# Patient Record
Sex: FEMALE | Race: WHITE | Employment: FULL TIME | URBAN - METROPOLITAN AREA
[De-identification: names, ages, dates, MRNs, and addresses within clinical notes are randomized per-mention and may not be internally consistent; named-entity substitution may affect disease eponyms.]

---

## 2017-01-09 ENCOUNTER — HOSPITAL ENCOUNTER (EMERGENCY)
Facility: HOSPITAL | Age: 22
Discharge: NURSING, REHAB OR LONG-TERM CARE | End: 2017-01-09
Attending: EMERGENCY MEDICINE
Payer: COMMERCIAL

## 2017-01-09 VITALS
WEIGHT: 115 LBS | OXYGEN SATURATION: 100 % | HEART RATE: 97 BPM | TEMPERATURE: 98 F | HEIGHT: 62 IN | SYSTOLIC BLOOD PRESSURE: 142 MMHG | RESPIRATION RATE: 20 BRPM | DIASTOLIC BLOOD PRESSURE: 75 MMHG | BODY MASS INDEX: 21.16 KG/M2

## 2017-01-09 DIAGNOSIS — F19.10 SUBSTANCE ABUSE (HCC): ICD-10-CM

## 2017-01-09 DIAGNOSIS — F41.9 ANXIETY: ICD-10-CM

## 2017-01-09 DIAGNOSIS — F32.A DEPRESSIVE DISORDER: Primary | ICD-10-CM

## 2017-01-09 LAB
ANION GAP SERPL CALC-SCNC: 6 MMOL/L (ref 0–18)
B-HCG UR QL: NEGATIVE
BASOPHILS # BLD: 0.1 K/UL (ref 0–0.2)
BASOPHILS NFR BLD: 1 %
BENZODIAZ UR QL SCN: DETECTED
BILIRUB UR QL: NEGATIVE
BUN SERPL-MCNC: 6 MG/DL (ref 8–20)
BUN/CREAT SERPL: 7.7 (ref 10–20)
CALCIUM SERPL-MCNC: 9.3 MG/DL (ref 8.5–10.5)
CANNABINOIDS UR QL SCN: DETECTED
CANNABINOIDS UR QL SCN: DETECTED
CHLORIDE SERPL-SCNC: 105 MMOL/L (ref 95–110)
CO2 SERPL-SCNC: 29 MMOL/L (ref 22–32)
COLOR UR: YELLOW
CREAT SERPL-MCNC: 0.78 MG/DL (ref 0.5–1.5)
EOSINOPHIL # BLD: 0.2 K/UL (ref 0–0.7)
EOSINOPHIL NFR BLD: 3 %
ERYTHROCYTE [DISTWIDTH] IN BLOOD BY AUTOMATED COUNT: 13.5 % (ref 11–15)
ETHANOL SERPL-MCNC: 1 MG/DL
GLUCOSE SERPL-MCNC: 93 MG/DL (ref 70–99)
GLUCOSE UR-MCNC: NEGATIVE MG/DL
HCT VFR BLD AUTO: 39.6 % (ref 35–48)
HGB BLD-MCNC: 13.3 G/DL (ref 12–16)
HGB UR QL STRIP.AUTO: NEGATIVE
KETONES UR-MCNC: NEGATIVE MG/DL
LYMPHOCYTES # BLD: 1.2 K/UL (ref 1–4)
LYMPHOCYTES NFR BLD: 22 %
MCH RBC QN AUTO: 30.1 PG (ref 27–32)
MCHC RBC AUTO-ENTMCNC: 33.7 G/DL (ref 32–37)
MCV RBC AUTO: 89.4 FL (ref 80–100)
MONOCYTES # BLD: 0.4 K/UL (ref 0–1)
MONOCYTES NFR BLD: 8 %
NEUTROPHILS # BLD AUTO: 3.8 K/UL (ref 1.8–7.7)
NEUTROPHILS NFR BLD: 66 %
NITRITE UR QL STRIP.AUTO: NEGATIVE
OSMOLALITY UR CALC.SUM OF ELEC: 287 MOSM/KG (ref 275–295)
PH UR: 7 [PH] (ref 5–8)
PLATELET # BLD AUTO: 301 K/UL (ref 140–400)
PMV BLD AUTO: 7.7 FL (ref 7.4–10.3)
POTASSIUM SERPL-SCNC: 3.3 MMOL/L (ref 3.3–5.1)
PROT UR-MCNC: NEGATIVE MG/DL
RBC # BLD AUTO: 4.43 M/UL (ref 3.7–5.4)
RBC #/AREA URNS AUTO: 1 /HPF
SODIUM SERPL-SCNC: 140 MMOL/L (ref 136–144)
SP GR UR STRIP: 1.02 (ref 1–1.03)
UROBILINOGEN UR STRIP-ACNC: <2
VIT C UR-MCNC: NEGATIVE MG/DL
WBC # BLD AUTO: 5.6 K/UL (ref 4–11)
WBC #/AREA URNS AUTO: 7 /HPF

## 2017-01-09 PROCEDURE — 81025 URINE PREGNANCY TEST: CPT

## 2017-01-09 PROCEDURE — 80320 DRUG SCREEN QUANTALCOHOLS: CPT | Performed by: EMERGENCY MEDICINE

## 2017-01-09 PROCEDURE — 36415 COLL VENOUS BLD VENIPUNCTURE: CPT

## 2017-01-09 PROCEDURE — 99285 EMERGENCY DEPT VISIT HI MDM: CPT

## 2017-01-09 PROCEDURE — 81001 URINALYSIS AUTO W/SCOPE: CPT | Performed by: EMERGENCY MEDICINE

## 2017-01-09 PROCEDURE — 80307 DRUG TEST PRSMV CHEM ANLYZR: CPT | Performed by: EMERGENCY MEDICINE

## 2017-01-09 PROCEDURE — 87086 URINE CULTURE/COLONY COUNT: CPT | Performed by: EMERGENCY MEDICINE

## 2017-01-09 PROCEDURE — 85025 COMPLETE CBC W/AUTO DIFF WBC: CPT | Performed by: EMERGENCY MEDICINE

## 2017-01-09 PROCEDURE — 80048 BASIC METABOLIC PNL TOTAL CA: CPT | Performed by: EMERGENCY MEDICINE

## 2017-01-09 RX ORDER — POTASSIUM CHLORIDE 20 MEQ/1
40 TABLET, EXTENDED RELEASE ORAL ONCE
Status: COMPLETED | OUTPATIENT
Start: 2017-01-09 | End: 2017-01-09

## 2017-01-09 RX ORDER — TRAZODONE HYDROCHLORIDE 50 MG/1
50 TABLET ORAL NIGHTLY PRN
Status: DISCONTINUED | OUTPATIENT
Start: 2017-01-09 | End: 2017-01-09

## 2017-01-09 NOTE — BH PROGRESS NOTE
Unable to verify coverage through [facility]. Pt's mom is calling to figure out insurance coverage. Awaiting call back.

## 2017-01-09 NOTE — BH PROGRESS NOTE
Funmi Vega from [facility] [phone] called and reports pt's mother, Judith Rizzo, completed her premium payment and the patient, Sheila Driver, has current active insurance coverage as of today 1/9/17.     Funmi Vega reports it may take a few days for online

## 2017-01-09 NOTE — ED INITIAL ASSESSMENT (HPI)
Via medics from home---patient got in verbal altercation with mother which she does often and made suicidal comments. Arben denies SI or HI at this time, she states her mother \"twists her words. \"    Mother wants to petition patient

## 2017-01-09 NOTE — ED NOTES
Seclusion in place. Belongings checked by security. Patient denies SI, HI, denies making comments to her mother. Reports having hx of anxiety and depression, but \"feels great. \" right now and has a good plan ahead of her.  Labs and urine sent, Dr. Anand Valente at
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
## 2017-01-09 NOTE — ED PROVIDER NOTES
Patient Seen in: [facility] AND [facility] Emergency Department    History   Patient presents with:  Eval-P (psychiatric)    Stated Complaint: psych eval    HPI    The patient is a 44-year-old female brought by EMS for psychiatric evaluation.   The patient has a Other Disorders Associated Paternal Grandmother    • ADHD Maternal Aunt    • Alcohol and Other Disorders Associated Maternal Aunt    • Substance Abuse Maternal Aunt    • Anxiety Maternal Aunt    • Alcohol and Other Disorders Associated Maternal Grandfather cutting   Neurological: She is alert and oriented to person, place, and time. She has normal reflexes. Skin: Skin is warm and dry. No rash noted. Psychiatric: Her speech is normal. Her mood appears anxious. She expresses impulsivity.  She exhibits a dep Impression:  Depressive disorder  (primary encounter diagnosis)  Anxiety  Substance abuse    Disposition:  Psychiatric transfer    Follow-up:  No follow-up provider specified.     Medications Prescribed:  Current Discharge Medication List

## 2017-01-09 NOTE — ED NOTES
Patient ate 100% of lunch tray. Mother to sign petition and plan to transfer. Seclusion remains in place.

## 2017-01-09 NOTE — BH PROGRESS NOTE
Julissa from [facility] called back. She stated the charge nurse is in staffing now and will review case. They have some pending discharges and think they will be able to accept pt tonight. They will call back in about half an hr.

## 2017-01-10 PROBLEM — Z86.14 HX MRSA INFECTION: Status: ACTIVE | Noted: 2017-01-10

## 2017-01-10 NOTE — BH PROGRESS NOTE
Completed pre-cert for IP LOC. Carlos Kirk 83 [phone] and spoke with Merary Auguste. Merary Auguste reports 4 days IP authorized 1/9/17-1/12/17 with review due 1/12/17. Merary Auguste reports a BC rep will contact [facility] UR to complete review on 1/12/17.     Authorization # [id number]

## 2017-01-10 NOTE — BH PROGRESS NOTE
Pt is accepted for transfer to [facility].     Accepting MD is Dr. Matthew Zavala    RN to RN report can be called into [phone]

## 2017-01-10 NOTE — BH PROGRESS NOTE
LM on KAYLA transfer line re: pre-cert completed, requesting RN to RN information. Awaiting call back.

## 2017-01-10 NOTE — ED NOTES
Attempt report x 1 at [facility] will call back.

## 2017-02-10 ENCOUNTER — APPOINTMENT (OUTPATIENT)
Dept: LAB | Facility: HOSPITAL | Age: 22
End: 2017-02-10
Attending: INTERNAL MEDICINE
Payer: COMMERCIAL

## 2017-02-10 ENCOUNTER — OFFICE VISIT (OUTPATIENT)
Dept: INTERNAL MEDICINE CLINIC | Facility: CLINIC | Age: 22
End: 2017-02-10

## 2017-02-10 VITALS
WEIGHT: 117 LBS | DIASTOLIC BLOOD PRESSURE: 76 MMHG | HEIGHT: 61.5 IN | BODY MASS INDEX: 21.81 KG/M2 | SYSTOLIC BLOOD PRESSURE: 125 MMHG | TEMPERATURE: 99 F | HEART RATE: 67 BPM

## 2017-02-10 DIAGNOSIS — F17.200 SMOKER: ICD-10-CM

## 2017-02-10 DIAGNOSIS — F32.A DEPRESSIVE DISORDER: ICD-10-CM

## 2017-02-10 DIAGNOSIS — Z00.00 PHYSICAL EXAM: ICD-10-CM

## 2017-02-10 DIAGNOSIS — I10 ESSENTIAL HYPERTENSION: Primary | ICD-10-CM

## 2017-02-10 LAB
ALBUMIN SERPL BCP-MCNC: 3.9 G/DL (ref 3.5–4.8)
ALBUMIN/GLOB SERPL: 1.4 {RATIO} (ref 1–2)
ALP SERPL-CCNC: 45 U/L (ref 39–325)
ALT SERPL-CCNC: 14 U/L (ref 14–54)
ANION GAP SERPL CALC-SCNC: 7 MMOL/L (ref 0–18)
AST SERPL-CCNC: 17 U/L (ref 15–41)
BASOPHILS # BLD: 0.1 K/UL (ref 0–0.2)
BASOPHILS NFR BLD: 1 %
BILIRUB SERPL-MCNC: 0.3 MG/DL (ref 0.3–1.2)
BUN SERPL-MCNC: 8 MG/DL (ref 8–20)
BUN/CREAT SERPL: 10.1 (ref 10–20)
CALCIUM SERPL-MCNC: 9.2 MG/DL (ref 8.5–10.5)
CHLORIDE SERPL-SCNC: 108 MMOL/L (ref 95–110)
CHOLEST SERPL-MCNC: 95 MG/DL (ref 110–200)
CO2 SERPL-SCNC: 24 MMOL/L (ref 22–32)
CREAT SERPL-MCNC: 0.79 MG/DL (ref 0.5–1.5)
EOSINOPHIL # BLD: 0.2 K/UL (ref 0–0.7)
EOSINOPHIL NFR BLD: 2 %
ERYTHROCYTE [DISTWIDTH] IN BLOOD BY AUTOMATED COUNT: 14.7 % (ref 11–15)
GLOBULIN PLAS-MCNC: 2.8 G/DL (ref 2.5–3.7)
GLUCOSE SERPL-MCNC: 77 MG/DL (ref 70–99)
HCT VFR BLD AUTO: 36.7 % (ref 35–48)
HDLC SERPL-MCNC: 39 MG/DL
HGB BLD-MCNC: 12.2 G/DL (ref 12–16)
LDLC SERPL CALC-MCNC: 45 MG/DL (ref 0–99)
LYMPHOCYTES # BLD: 1.2 K/UL (ref 1–4)
LYMPHOCYTES NFR BLD: 13 %
MCH RBC QN AUTO: 30 PG (ref 27–32)
MCHC RBC AUTO-ENTMCNC: 33.3 G/DL (ref 32–37)
MCV RBC AUTO: 90.2 FL (ref 80–100)
MONOCYTES # BLD: 0.6 K/UL (ref 0–1)
MONOCYTES NFR BLD: 6 %
NEUTROPHILS # BLD AUTO: 7.7 K/UL (ref 1.8–7.7)
NEUTROPHILS NFR BLD: 79 %
NONHDLC SERPL-MCNC: 56 MG/DL
OSMOLALITY UR CALC.SUM OF ELEC: 285 MOSM/KG (ref 275–295)
PLATELET # BLD AUTO: 250 K/UL (ref 140–400)
PMV BLD AUTO: 8.5 FL (ref 7.4–10.3)
POTASSIUM SERPL-SCNC: 3.7 MMOL/L (ref 3.3–5.1)
PROT SERPL-MCNC: 6.7 G/DL (ref 5.9–8.4)
RBC # BLD AUTO: 4.07 M/UL (ref 3.7–5.4)
SODIUM SERPL-SCNC: 139 MMOL/L (ref 136–144)
TRIGL SERPL-MCNC: 54 MG/DL (ref 1–149)
TSH SERPL-ACNC: 1.09 UIU/ML (ref 0.34–5.6)
VIT B12 SERPL-MCNC: 371 PG/ML (ref 181–914)
WBC # BLD AUTO: 9.7 K/UL (ref 4–11)

## 2017-02-10 PROCEDURE — 36415 COLL VENOUS BLD VENIPUNCTURE: CPT | Performed by: INTERNAL MEDICINE

## 2017-02-10 PROCEDURE — 86580 TB INTRADERMAL TEST: CPT | Performed by: INTERNAL MEDICINE

## 2017-02-10 PROCEDURE — 80307 DRUG TEST PRSMV CHEM ANLYZR: CPT

## 2017-02-10 PROCEDURE — 99395 PREV VISIT EST AGE 18-39: CPT | Performed by: INTERNAL MEDICINE

## 2017-02-10 RX ORDER — QUETIAPINE 25 MG/1
25 TABLET, FILM COATED ORAL NIGHTLY
COMMUNITY
End: 2020-01-21

## 2017-02-10 NOTE — PROGRESS NOTES
HPI:   Nicholas Ojeda is a 22year old female who presents for a complete physical exam and needs drug screen and ppd test for work . Symptoms: periods are regular. Patient denies any complais. She grover have htn since age of 7, initially due to joint pain Comment: occassionally     Exercise: 4 times per week. Diet: watches fats closely     REVIEW OF SYSTEMS:     Constitutional Negative Chills, fatigue and fever.    ENMT Negative Hearing loss, nasal drainage, pain in/around ear, sinus pressure and sor Cardiovascular Normal Regular rate and rhythm. No murmurs, gallops, or rubs   Vascular Normal Pulses - Dorsalis pedis: Normal. Bruits - Carotids: Absent. Extremity Normal No edema. No varicosities. Abdomen Normal Inspection normal, BS active.  No abd

## 2017-02-10 NOTE — PATIENT INSTRUCTIONS
Life After Combat: Coping with Depression    Returning home from combat can be both joyful and challenging. It’s common to have mixed emotions during this time. But are sadness, guilt, or despair taking over your life?  You might feel alone, as if no one Being in combat has put you under a lot of stress. You may be grieving for friends who , or upset about changes that happened at home while you were away. You may feel guilty or sad about the war.  And you may be coping with physical injuries that have · Antidepressant medications help relieve symptoms. It may take a few weeks for an antidepressant to start to improve your mood. If it doesn’t seem to be working, it may need more time.  Or you may need to try more than one medication or dosage before findi · Get relief from stress. Ask your healthcare provider about relaxation exercises and techniques to help relieve stress. · Eat right. A balanced and healthy diet helps keep your body healthy. · Put off major decisions. Depression can cloud your judgment. Date Last Reviewed: 2/4/2015  © 0132-6104 The [address] [place] [address], [address] [place], [place] [address]. All rights reserved. This information is not intended as a substitute for professional medical care.  Always follow your healthcare professional' · Get relief from stress. Ask your healthcare provider for relaxation exercises and techniques to help relieve stress. · Eat right. A balanced and healthy diet helps keep your body healthy.   Date Last Reviewed: 1/19/2015  © 7047-1990 The [employer],

## 2017-02-13 ENCOUNTER — NURSE ONLY (OUTPATIENT)
Dept: INTERNAL MEDICINE CLINIC | Facility: CLINIC | Age: 22
End: 2017-02-13

## 2017-02-13 LAB — 25(OH)D3 SERPL-MCNC: 44.6 NG/ML

## 2017-10-14 NOTE — ED INITIAL ASSESSMENT (HPI)
FAMILY FOUND HER UNRESPONSIVE AGONAL BREATHING, MEDICS STATED THE PATIENT WAS 60 % ON RA, ALERT AND VERBAL NOW.  NO PRESENT COMPLAINTS, + SNORTED HEROIN

## 2017-10-14 NOTE — ED PROVIDER NOTES
Patient Seen in: [facility] Emergency Department    History   Patient presents with:  Poisoning/Overdose (metabolic, psychiatric)    Stated Complaint: Theoplis [place]    HPI    52-year-old female with history of hypertension, anxiety, and depressi %  O2 Device: None (Room air)    Current:/70   Pulse 102   Temp 98.8 °F (37.1 °C) (Temporal)   Resp 18   SpO2 100%         Physical Exam    General Appearance: alert, no distress  Eyes: pupils equal and round no pallor or injection  ENT, Mouth: mucou

## 2017-10-14 NOTE — ED NOTES
PATIENT PLANS ON MOVING TO [place] TOMORROW TO BE WITH HER DAD, RECENT DISCHARGE FROM REHAB, TODAY SNORTED HEROIN AND TOOK A Rhoda Bonnie.  PATIENT ALERT AN COOPERATIVE

## 2017-10-15 NOTE — BH PROGRESS NOTE
Spoke to Alexx son at [facility] and she stated that due to isolation concerns, they do not have a bed at this time.

## 2017-10-15 NOTE — BH PROGRESS NOTE
Spoke to Lilian Bazan at [facility] and she requested packet be faxed and will call back re: availability

## 2017-10-15 NOTE — ED INITIAL ASSESSMENT (HPI)
SPOKE WITH THE MOTHER, GRANDMOTHER AND POLICE, TEARFUL FAMILY MEMBERS STATING THAT FOR \"A WHILE NOW\" PATIENT HAS BEEN TALKING TO HER FAMILY STATING HOW DEPRESSED SHE HAS BEEN, FEELING HOPELESS And suicidal. Mom states that last night she was getting read

## 2017-10-15 NOTE — ED NOTES
SPOKE WITH THE MOM, DR Macario Peñaloza TALKED TO THE MOM AND THEN THE PATIENT, SECURITY CALLED FOR A FLIGHT RISK AND POSSIBLY RISK TO SELF, PATIENT DENIED SI ALTHOUGH MOM AND GRANDMOTHER DID PETITION AND DR Macario Peñaloza DID CERTIFICATE
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
## 2017-10-15 NOTE — ED NOTES
Pt given d/c instructions and stated understanding.  Pt to ambulate out of ed at this time with mother
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
## 2017-10-15 NOTE — BH PROGRESS NOTE
Spoke to Chase Ferreira at [facility] and pt was accepted by Dr. Radha Cerrato information: Spoke to Clinton Palacios at [facility] 45  Approved 4 days ( 10/15-10/18) with review on 18th and a  will call UR   Auth: [id number]    They will call nurse for r

## 2017-10-15 NOTE — BH PROGRESS NOTE
Called [facility] back and they stated that they would need to call us back after reviewing packet    [facility] stated they do not have any available beds.      I left a VM for [facility] re: bed availability and waiting to hear back

## 2017-10-15 NOTE — ED NOTES
PATIENT STATED THAT HER MOM AND GRANDMOTHER WHERE TRYING TO SAY THAT SHE WAS SUICIDAL AND THAT SHE ATTEMPTED TO HURT HERSELF YESTERDAY. PATIENT DENIES SI. YEN MATTHEW, KHANG PSYCH LIASON WITH PATIENT AND ALEKSEY

## 2017-10-15 NOTE — ED PROVIDER NOTES
Patient Seen in: [facility] AND [facility] Emergency Department    History   Patient presents with:  Checkup    Stated Complaint: EVALUATION    HPI    21-year-old female presents via EMS for psychiatric evaluation.   Patient was seen in the hospital yesterday aft reviewed and are negative. Positive for stated complaint: EVALUATION  Other systems are as noted in HPI. Constitutional and vital signs reviewed. All other systems reviewed and negative except as noted above.     PSFH elements reviewed from today making as well as her overdose on heroin last night and history of suicide attempts in the past, I believe the patient is high risk for self-harm. She requires psychiatric evaluation. She is medically cleared for psychiatric admission.                Disp

## 2017-10-16 NOTE — ED NOTES
Called Titi Diaz with [facility], made her aware pt is en route.

## 2018-08-30 ENCOUNTER — APPOINTMENT (OUTPATIENT)
Dept: OTHER | Facility: HOSPITAL | Age: 23
End: 2018-08-30
Attending: FAMILY MEDICINE

## 2018-08-30 ENCOUNTER — OFFICE VISIT (OUTPATIENT)
Dept: INTERNAL MEDICINE CLINIC | Facility: HOSPITAL | Age: 23
End: 2018-08-30
Attending: EMERGENCY MEDICINE

## 2018-08-30 DIAGNOSIS — Z00.00 WELLNESS EXAMINATION: Primary | ICD-10-CM

## 2018-08-30 PROCEDURE — 86480 TB TEST CELL IMMUN MEASURE: CPT

## 2018-08-31 LAB
M TB IFN-G CD4+ BCKGRND COR BLD-ACNC: -0.01 IU/ML
M TB IFN-G CD4+ T-CELLS BLD-ACNC: 0.05 IU/ML
M TB TUBERC IFN-G BLD QL: NEGATIVE
M TB TUBERC IGNF/MITOGEN IGNF CONTROL: 7.54 IU/ML

## 2019-07-31 ENCOUNTER — APPOINTMENT (OUTPATIENT)
Dept: OTHER | Facility: HOSPITAL | Age: 24
End: 2019-07-31
Attending: EMERGENCY MEDICINE

## 2019-08-20 NOTE — ED NOTES
Patient tearful requesting multiple apple juice, after 4th juice patient was witnessed by  running out of the room in room 25 through the side door out the 26 of the

## 2019-08-20 NOTE — ED PROVIDER NOTES
Pt endorsed to me by Dr. Ramakrishna Knutson for continuation of care. Pt was evaluated by crisis worker. Pt is not a danger to herself or others. Denies any SI, HI, hallucinations. Pt to be discharged home.

## 2019-08-20 NOTE — ED PROVIDER NOTES
Patient Seen in: [facility] AND [facility] Emergency Department    History   Patient presents with:  Overdose    Stated Complaint: OVERDOSE    HPI    81-year-old female with past medical history significant for polysubstance abuse presents to the emergency depar Nontender. Nondistended. Soft. Bowel sounds are normal.   Back:   : Musculoskeletal: Normal range of motion. No deformity. Lymphadenopathy: No sig cervical LAD   Neurological: Awake, alert. Normal reflexes. No cranial nerve deficit.     Skin: Skin is ---------                               -----------         ------                     CBC W/ DIFFERENTIAL[[id number]]          Abnormal            Final result                 Please view results for these tests on the individual orders.    URINE CULTURE,

## 2019-08-20 NOTE — ED NOTES
Patient in room 25 asking for multiple juices,  witnessed patient running out the side door in hospital gon, managed to get past doorways and out into the parking lot, patient found by security on a street, brought in by [employer] and

## 2019-08-21 NOTE — BH LEVEL OF CARE ASSESSMENT
Level of Care Assessment Note    General Questions  Why are you here?: Patient initially unable to be assessed due to drug overdose.    Precipitating Events: Patient was brought to the ED after [facility] PD found her unresponsive, patient given Narcan and is Denies    Danger to Others/Property  Have you harmed someone or had thoughts about wanting someone harmed or killed in the past 30 days?: No  Have you harmed someone or had thoughts about wanting someone harmed or killed further back than 30 days?: No  Cur you are too thin?: No  Would you say that Food dominates your life?: No  SCOFF Score: 0                                                                                                        Current/Previous MH/CD Providers  Hospitalizations, Placements, T it has ever been? : No         Sedative Use  Age at first use?: 19  Route: Oral  Average current amount used? : \"I don't know. \"  How long with this pattern of use?: off and on  Last Use?: last night  Longest period of sobriety/abstinence?: 30 days  Is yo Speech: Appropriate  Intensity of Volume: Ordinary  Clarity: Clear  Cognition  Concentration: Unimpaired  Memory: Recent memory intact; Remote memory intact  Orientation Level: Oriented X4  Insight: Poor  Fair/poor insight as evidenced by: evidenced by symp Disorder             Sign-In  Patient Verbalized Understanding:  Yes

## 2019-08-21 NOTE — ED NOTES
Patient discharged home in no acute distress in care of mom and richariyehuda. A&Ox4, skin p/w/d, denies cp/sob. Ambulating with steady gait and verbalized understanding of d/c instructions and prescriptions.

## 2019-10-18 NOTE — ED INITIAL ASSESSMENT (HPI)
Per EMS called for heroin overdose. Narcan 6 mg given per IN. Pt now aox4; was initially unresponsive.

## 2019-10-18 NOTE — ED PROVIDER NOTES
Patient Seen in: [facility] AND [facility] Emergency Department      History   Patient presents with:  Eval-P (psychiatric)    Stated Complaint: OD    HPI    21 yo female brought by EMS after heroin overdose.  She was found unresponsive with decreased respiration Conjunctiva/sclera: Conjunctivae normal.      Pupils: Pupils are equal, round, and reactive to light. Neck:      Musculoskeletal: Normal range of motion and neck supple. Cardiovascular:      Rate and Rhythm: Normal rate and regular rhythm.    Pulmonary:

## 2019-11-07 NOTE — ED PROVIDER NOTES
Patient Seen in: [facility] AND [facility] Emergency Department      History   Patient presents with: Other    Stated Complaint: DUI/possible pregnancy    HPI    Patient is a 45-year-old female with a history of polysubstance abuse.   She states she took some Va sleeping but easily aroused she answers questions appropriately she is slightly slurred speech. HEENT:   Head: Normocephalic and atraumatic.    Right Ear: External ear normal.   Left Ear: External ear normal.   Nose: Nose normal.   Mouth/Throat: Oropharynx Impression:  Substance abuse ([facility] [address]  (primary encounter diagnosis)    Disposition:  Discharge  11/7/2019  8:43 am    Follow-up:  Meagan Hays, [address] [place] [address]  [phone]    Schedule an appointment as soon as p

## 2019-11-07 NOTE — ED NOTES
Pt to ED A/O x 4- pt was arrested this AM at 0200 by [facility] for DUI- while pt was in custody EMS state that pt said she had a pregnancy scare this past week and took Heroin and Xanax for her anxiety.  PD sent pt here for evaluation after she was releas

## 2019-11-07 NOTE — ED NOTES
Security checked BF prior to entering-  Ok to send pt home per ER MD. Pt with steady gait. Spoke with pt mother.

## 2019-11-09 NOTE — ED PROVIDER NOTES
Patient Seen in: [facility] AND [facility] Emergency Department      History   Patient presents with:  Eval-P (psychiatric)    Stated Complaint: alleged overdose    HPI    26-year-old female with history of substance abuse who reportedly is going for intake jonathan atraumatic. Eyes: Conjunctivae are normal. Pupils are equal, round, and reactive to light. Neck: Normal range of motion. Neck supple. Cardiovascular: Normal rate, regular rhythm and intact distal pulses.     Pulmonary/Chest: Effort normal. No respirat List

## 2019-11-09 NOTE — ED NOTES
Call from triage desk. Mother, Rodger Aragon is leaving to  clothes for Rodger Aragon and return. Mother informed triage that Skye Pierson has a bed at [facility].   Mother cane be reached at [phone]

## 2019-11-09 NOTE — ED NOTES
Pt denies SI/HI at time of discharge. Pt mother to take patient home. Ok per patient for mother Mcintosh Memos to receive results of urine tests when the are resulted.

## 2019-11-09 NOTE — ED INITIAL ASSESSMENT (HPI)
Mom was unable to get in touch with her daughter who is a known heroin user, called 911 and multiple boxes of narcan in her house, arrives to ED with c/o no sleep in 4 days, + extremely dry mm, slurred speech and nodding off and unable to pay attention, kenzie

## 2019-11-13 NOTE — ED INITIAL ASSESSMENT (HPI)
Pt reports snorting 2 lines of heroine, she states it may have been laced with fentanyl. Boyfriend gave 4mg narcan IN.  Pt a/o x4 speaking in full sentences

## 2019-11-13 NOTE — ED PROVIDER NOTES
Patient Seen in: [facility] AND [facility] Emergency Department      History   Patient presents with:  Karen-D (detox)    Stated Complaint: OD    HPI    60-year-old female presents for an accidental overdose.   Patient states that she was using heroin and thinks t ED Triage Vitals [11/13/19 1724]   /66   Pulse 88   Resp 18   Temp 99.3 °F (37.4 °C)   Temp src Temporal   SpO2 99 %   O2 Device None (Room air)       Current:BP 99/52   Pulse 98   Temp 99.3 °F (37.4 °C) (Temporal)   Resp 18   Ht 157.5 cm (5' 2\" Patient doing well, alert and oriented x4, O2 sat is 99% RA.  ------------------------------------------------------------  Time: 11/13 1923  Comment: Patient still A/O x4, satting at 98% RA.               MDM    Patient was observed for 2 hours.   She was 0

## 2019-12-19 NOTE — LETTER
Date & Time: 5/17/2020, 5:09 PM  Patient: [facility]  Encounter Provider(s):    Favio Abrams MD       To Whom It May Concern:    Gunjan Vega was seen and treated in our department on 5/17/2020. She can return to work 5-.     If you
No

## 2020-02-13 ENCOUNTER — HOSPITAL ENCOUNTER (OUTPATIENT)
Age: 25
Discharge: ANOTHER HOSPITAL | End: 2020-02-13
Payer: COMMERCIAL

## 2020-02-13 DIAGNOSIS — J45.901 SEVERE ASTHMA WITH ACUTE EXACERBATION, UNSPECIFIED WHETHER PERSISTENT: Primary | ICD-10-CM

## 2020-02-13 DIAGNOSIS — R41.82 ALTERED MENTAL STATUS, UNSPECIFIED ALTERED MENTAL STATUS TYPE: ICD-10-CM

## 2020-02-13 DIAGNOSIS — R06.03 RESPIRATORY DISTRESS: ICD-10-CM

## 2020-02-13 DIAGNOSIS — R55 SYNCOPE, NEAR: ICD-10-CM

## 2020-02-13 PROCEDURE — 93010 ELECTROCARDIOGRAM REPORT: CPT

## 2020-02-13 PROCEDURE — 99214 OFFICE O/P EST MOD 30 MIN: CPT

## 2020-02-13 PROCEDURE — 94640 AIRWAY INHALATION TREATMENT: CPT

## 2020-02-13 PROCEDURE — 93005 ELECTROCARDIOGRAM TRACING: CPT

## 2020-02-13 PROCEDURE — 93010 ELECTROCARDIOGRAM REPORT: CPT | Performed by: EMERGENCY MEDICINE

## 2020-02-13 RX ORDER — IPRATROPIUM BROMIDE AND ALBUTEROL SULFATE 2.5; .5 MG/3ML; MG/3ML
3 SOLUTION RESPIRATORY (INHALATION) ONCE
Status: COMPLETED | OUTPATIENT
Start: 2020-02-13 | End: 2020-02-13

## 2020-02-13 NOTE — ED PROVIDER NOTES
Patient Seen in: [address]      History   Patient presents with:  Dyspnea GOMEZ SOB    Stated Complaint: difficulty breathing    HPI    41-year-old female walked in from her outpatient rehab program upstairs dusky and yuan wheezing. Cardiovascular: Positive for chest pain. Neurological: Positive for dizziness, weakness and light-headedness. Positive for stated complaint: difficulty breathing  Other systems are as noted in HPI.   Constitutional and vital signs revie

## 2020-02-13 NOTE — ED INITIAL ASSESSMENT (HPI)
Pt presents to the IC with c/o chest pain since this morning, slowly getting worse. Pt also notes SOB. Pt quit smoking but started vaping again recently. Pt arrives unregistered, dusky in color with a pulse ox at 54%.  Pt gave herself neb tx x2 this morning

## 2020-04-15 ENCOUNTER — HOSPITAL ENCOUNTER (EMERGENCY)
Facility: HOSPITAL | Age: 25
Discharge: HOME | End: 2020-04-15
Attending: EMERGENCY MEDICINE
Payer: COMMERCIAL

## 2020-04-15 VITALS
BODY MASS INDEX: 23.22 KG/M2 | WEIGHT: 123 LBS | TEMPERATURE: 98 F | SYSTOLIC BLOOD PRESSURE: 164 MMHG | HEIGHT: 61 IN | RESPIRATION RATE: 18 BRPM | DIASTOLIC BLOOD PRESSURE: 119 MMHG | OXYGEN SATURATION: 94 % | HEART RATE: 100 BPM
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
## 2020-04-15 DIAGNOSIS — S40.812A ABRASION OF LEFT UPPER EXTREMITY, INITIAL ENCOUNTER: ICD-10-CM

## 2020-04-15 DIAGNOSIS — S00.83XA CONTUSION OF FACE, INITIAL ENCOUNTER: ICD-10-CM

## 2020-04-15 PROCEDURE — 99283 EMERGENCY DEPT VISIT LOW MDM: CPT

## 2020-04-15 RX ORDER — ACETAMINOPHEN 500 MG
1000 TABLET ORAL ONCE
Status: COMPLETED | OUTPATIENT
Start: 2020-04-15 | End: 2020-04-15
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
## 2020-05-17 ENCOUNTER — APPOINTMENT (OUTPATIENT)
Dept: GENERAL RADIOLOGY | Facility: HOSPITAL | Age: 25
End: 2020-05-17
Attending: EMERGENCY MEDICINE
Payer: COMMERCIAL

## 2020-05-17 PROBLEM — F11.20 OPIATE ADDICTION (HCC): Status: ACTIVE | Noted: 2020-05-17

## 2020-05-17 PROCEDURE — 71045 X-RAY EXAM CHEST 1 VIEW: CPT | Performed by: EMERGENCY MEDICINE

## 2020-05-17 NOTE — BH LEVEL OF CARE ASSESSMENT
Level of Care Assessment Note    General Questions  Why are you here?: \"This withdrawal is really bad. I was having nausea, chills sweeating, my stomach hurt really bad. My mother called Krish Santiago and they wanted me checked out.   The past 2 weeks BARBARA young thoughts of killing yourself? (past 30 days): No  6.  Have you ever done anything, started to do anything, or prepared to do anything to end your life? (lifetime): No  Score - BH OV: No Risk  Describe : suicidal thoughts/urges are denied  Is your experience Calculations  Weight: 115 lb  BMI (Calculated): 21.7  IBW LBS [place]: 105 LBS  IBW %: 109.52 %  IBW + 10%: 115.5 LBS  IBW - 10%: 94.5 LBS  SCOFF Questionnaire  Do you make yourself Sick because you feel uncomfortably full?: No  Do you worry that you have lo 5/15/20  Longest period of sobriety/abstinence?: \"a few months\"  Is your current use the most/worst it has ever been? : Yes                        Support for Recovery  Is your living environment a supportive place for recovery?: No  Describe: boyfriend Slouched  Rate of Movement: Normal  Mood and Affect  Mood or Feelings: Sadness  Appropriateness of Affect: Congruent to mood  Range of Affect: Normal  Stability of Affect: Stable  Attitude toward staff: Co-operative  Speech  Rate of Speech: Appropriate  Fl histroy of mental health treatment/ medations is denied other than the Suboxone/     Risk/Protective Factors  Risk Factors: Substance use or abuse  Protective Factors: mother and co-workers    Motivational Stage of Change  Motivational Stage of Change: Con

## 2020-05-17 NOTE — ED INITIAL ASSESSMENT (HPI)
Brought by [facility] for heroin withdrawal. Pt last snorted heroin on Friday. Since then she's been having chills, NVD, & muscle soreness. AOx3 on arrival. Denies additional drug or alcohol use. Hx of self harm, denies SI/HI. Hx of HTN.    EMS chem 136

## 2020-05-17 NOTE — ED NOTES
Pt to ED via EMS for heroin withdrawal. Pt states she has been using heroin daily for 2 weeks. Pt states she snorts heroin. Pt with hx of heroin use. Pt denies ETOH. Pt denies HI/SI. Pt calm and cooperative with staff. Pt states +chills at home.  Pt c/o in

## 2020-05-17 NOTE — ED PROVIDER NOTES
Patient Seen in: [facility] Department      History   Patient presents with:  Karen-MEENU    Stated Complaint: heroin detox - last used 3 days ago    HPI    The patient is a 25-year-old female with a history of heroin abuse who presents with No murmur. Pulmonary:      Effort: Pulmonary effort is normal.      Breath sounds: Normal breath sounds. Abdominal:      General: Bowel sounds are normal. There is no distension. Palpations: Abdomen is soft. There is no mass. Tenderness:  Ther CBC WITH DIFFERENTIAL WITH PLATELET    Narrative: The following orders were created for panel order CBC WITH DIFFERENTIAL WITH PLATELET.   Procedure                               Abnormality         Status                     --------- [facility])  Essential hypertension    Disposition:  Discharge  5/17/2020  4:18 pm    Follow-up:  Shannon Cutler, [address] [place] [address] Drive [facility] [address] [phone]                Medications Prescribed:  Current Discharge Medication List

## 2020-05-21 NOTE — ED NOTES
Pt has boyfriends phone and states it is not hers. Pt gave consent to give phone back to boyfriend. Phone given to Kathy Carrillo from [place] at this time.
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
## 2020-05-21 NOTE — ED PROVIDER NOTES
Patient Seen in: [facility] Emergency Department      History   Patient presents with:  Eval-D  Alcohol Intoxication    Stated Complaint:     HPI    81-year-old female with past medical history significant for polysubstance abuse, anxiety, asthma, (36.3 °C)   Temp src Temporal   SpO2 94 %   O2 Device None (Room air)       Current:BP (!) 140/94   Pulse 106   Temp 97.4 °F (36.3 °C) (Temporal)   Resp 19   Ht 157.5 cm (5' 2\")   Wt 52.2 kg   LMP 05/13/2020   SpO2 94%   BMI 21.03 kg/m²         Physical E

## 2020-05-21 NOTE — ED NOTES
Witness to conversation between pt and RN. Pt with boyfriend's phone. Pt gave permission for phone to be returned.

## 2020-07-03 ENCOUNTER — HOSPITAL ENCOUNTER (OUTPATIENT)
Age: 25
Discharge: HOME | End: 2020-07-03
Payer: COMMERCIAL

## 2020-07-03 VITALS
OXYGEN SATURATION: 97 % | RESPIRATION RATE: 16 BRPM | SYSTOLIC BLOOD PRESSURE: 120 MMHG | TEMPERATURE: 98 F | HEART RATE: 62 BPM | BODY MASS INDEX: 22.66 KG/M2 | DIASTOLIC BLOOD PRESSURE: 74 MMHG | WEIGHT: 120 LBS | HEIGHT: 61 IN

## 2020-07-03 DIAGNOSIS — J45.901 ASTHMA EXACERBATION, MILD: ICD-10-CM

## 2020-07-03 DIAGNOSIS — Z20.2 STD EXPOSURE: Primary | ICD-10-CM

## 2020-07-03 DIAGNOSIS — Z20.822 ENCOUNTER FOR LABORATORY TESTING FOR COVID-19 VIRUS: ICD-10-CM

## 2020-07-03 LAB
B-HCG UR QL: NEGATIVE
BILIRUB UR QL STRIP: NEGATIVE
CLARITY UR: CLEAR
COLOR UR: YELLOW
GLUCOSE UR STRIP-MCNC: NEGATIVE MG/DL
KETONES UR STRIP-MCNC: NEGATIVE MG/DL
LEUKOCYTE ESTERASE UR QL STRIP: NEGATIVE
NITRITE UR QL STRIP: NEGATIVE
PH UR STRIP: 6 [PH]
PROT UR STRIP-MCNC: NEGATIVE MG/DL
SP GR UR STRIP: >=1.03
UROBILINOGEN UR STRIP-ACNC: <2 MG/DL

## 2020-07-03 PROCEDURE — 81002 URINALYSIS NONAUTO W/O SCOPE: CPT | Performed by: NURSE PRACTITIONER

## 2020-07-03 PROCEDURE — 99214 OFFICE O/P EST MOD 30 MIN: CPT | Performed by: NURSE PRACTITIONER

## 2020-07-03 PROCEDURE — 87591 N.GONORRHOEAE DNA AMP PROB: CPT | Performed by: NURSE PRACTITIONER

## 2020-07-03 PROCEDURE — 87491 CHLMYD TRACH DNA AMP PROBE: CPT | Performed by: NURSE PRACTITIONER

## 2020-07-03 PROCEDURE — U0003 INFECTIOUS AGENT DETECTION BY NUCLEIC ACID (DNA OR RNA); SEVERE ACUTE RESPIRATORY SYNDROME CORONAVIRUS 2 (SARS-COV-2) (CORONAVIRUS DISEASE [COVID-19]), AMPLIFIED PROBE TECHNIQUE, MAKING USE OF HIGH THROUGHPUT TECHNOLOGIES AS DESCRIBED BY CMS-2020-01-R: HCPCS | Performed by: NURSE PRACTITIONER

## 2020-07-03 PROCEDURE — 81025 URINE PREGNANCY TEST: CPT | Performed by: NURSE PRACTITIONER

## 2020-07-03 RX ORDER — AZITHROMYCIN 250 MG/1
1000 TABLET, FILM COATED ORAL ONCE
Status: COMPLETED | OUTPATIENT
Start: 2020-07-03 | End: 2020-07-03

## 2020-07-03 RX ORDER — PREDNISONE 20 MG/1
40 TABLET ORAL DAILY
Qty: 10 TABLET | Refills: 0 | Status: SHIPPED | OUTPATIENT
Start: 2020-07-03 | End: 2020-07-08

## 2020-07-03 RX ORDER — ALBUTEROL SULFATE 90 UG/1
AEROSOL, METERED RESPIRATORY (INHALATION) EVERY 6 HOURS PRN
COMMUNITY

## 2020-07-03 RX ORDER — ALBUTEROL SULFATE 2.5 MG/3ML
2.5 SOLUTION RESPIRATORY (INHALATION) EVERY 4 HOURS PRN
Qty: 30 AMPULE | Refills: 0 | Status: SHIPPED | OUTPATIENT
Start: 2020-07-03 | End: 2020-08-02

## 2020-07-03 NOTE — ED INITIAL ASSESSMENT (HPI)
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 Pt notes her office is requesting a COVID test be performed. Pt also notes she has been exposed to chlamydia and is looking to get tested for that as well.  Fabiana stone

## 2020-07-03 NOTE — ED PROVIDER NOTES
Patient presents with:  Cough/URI  Std Screen      HPI:     Estephania Coker is a 22year old female who presents for multiple complaints. The patient states she was exposed to gonorrhea and chlamydia and would like testing and treatment.   She states she has had s Social Needs      Financial resource strain: Not on file      Food insecurity:        Worry: Not on file        Inability: Not on file      Transportation needs:        Medical: Not on file        Non-medical: Not on file    Tobacco Use      Smoking status nourished, well hydrated, no distress  SKIN: good skin turgor, no obvious rashes  HEENT: atraumatic, normocephalic, ears, nose and throat are clear  EYES: sclera non icteric bilateral  NECK: supple, no adenopathy  LUNGS: Scattered wheezes bilaterally.   No Yellow    Urine Clarity Clear Clear    Specific Gravity, Urine >=1.030 1.005 - 1.030    PH, Urine 6.0 5.0 - 8.0    Protein urine Negative Negative mg/dL    Glucose, Urine Negative Negative mg/dL    Ketone, Urine Negative Negative mg/dL    Bilirubin, Urine

## 2020-07-04 LAB — SARS-COV-2 RNA RESP QL NAA+PROBE: NOT DETECTED

## 2020-07-06 LAB
C TRACH DNA SPEC QL NAA+PROBE: NEGATIVE
N GONORRHOEA DNA SPEC QL NAA+PROBE: NEGATIVE

## 2020-07-20 PROBLEM — F11.20 OPIOID DEPENDENCE (HCC): Status: ACTIVE | Noted: 2020-07-20
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
## 2020-07-20 NOTE — ED PROVIDER NOTES
Patient Seen in: [facility] Emergency Department      History   Patient presents with:  Eval-D    Stated Complaint: opioid withdrawal    HPI    61-year-old female complaining of opioid withdrawal.  This patient admits to using fentanyl approximately Pulse 86   Temp 98 °F (36.7 °C)   Resp 17   Ht 154.9 cm (5' 1\")   Wt 52.2 kg   LMP 07/06/2020   SpO2 94%   BMI 21.73 kg/m²         Physical Exam    Alert and oriented ×3 in no acute distress. HEENT exam within normal limits.   Neck supple without lymphad [place] for admission for opiate withdrawal.        MDM     As above              Disposition and Plan     Clinical Impression:  Opioid dependence with withdrawal ([facility] [address]  (primary encounter diagnosis)    Disposition:  There is no disposition on file for this v

## 2020-07-20 NOTE — ED NOTES
Pt being taken to [facility] by her mom. Pt is aware she has been assigned to room 613A. Pt is AAOX3 and in no distress. Pt resp easy, non labored.

## 2020-07-20 NOTE — BH PROGRESS NOTE
Social Distancing Screener  1. Have you been practicing social distancing? Yes     2. Have you been wearing a mask when in the community? Yes     3. Who do you live with, and are they following social distancing and wearing a mask practice as well?  You

## 2020-07-20 NOTE — ED NOTES
Left message for [facility] RN to call back if report is needed.

## 2020-07-21 PROBLEM — E07.81 EUTHYROID SICK SYNDROME: Status: ACTIVE | Noted: 2020-07-21

## 2020-07-21 PROBLEM — J45.909 ASTHMA (HCC): Status: ACTIVE | Noted: 2020-07-21

## 2020-07-21 PROBLEM — J45.909 ASTHMA: Status: ACTIVE | Noted: 2020-07-21

## 2020-09-10 ENCOUNTER — HOSPITAL (OUTPATIENT)
Dept: OTHER | Age: 25
End: 2020-09-10
Attending: PHYSICIAN ASSISTANT

## 2020-09-10 LAB
A/G RATIO_: 1.3
A/G RATIO_: 1.3
ABS LYMPH: 2.2 K/CUMM (ref 1–3.5)
ABS LYMPH: 2.2 K/CUMM (ref 1–3.5)
ABS MONO: 0.8 K/CUMM (ref 0.1–0.8)
ABS MONO: 0.8 K/CUMM (ref 0.1–0.8)
ABS NEUTRO: 4.3 K/CUMM (ref 2–8)
ABS NEUTRO: 4.3 K/CUMM (ref 2–8)
ALBUMIN: 3.9 G/DL (ref 3.5–5)
ALBUMIN: 3.9 G/DL (ref 3.5–5)
ALK PHOS: 107 UNIT/L (ref 50–124)
ALK PHOS: 107 UNIT/L (ref 50–124)
ALT/GPT: 47 UNIT/L (ref 0–55)
ALT/GPT: 47 UNIT/L (ref 0–55)
ANION GAP SERPL CALC-SCNC: 12 MEQ/L (ref 10–20)
ANION GAP SERPL CALC-SCNC: 12 MEQ/L (ref 10–20)
AST/GOT: 34 UNIT/L (ref 5–34)
AST/GOT: 34 UNIT/L (ref 5–34)
BASOPHIL: 1 % (ref 0–1)
BASOPHIL: 1 % (ref 0–1)
BILI TOTAL: 0.2 MG/DL (ref 0.2–1)
BILI TOTAL: 0.2 MG/DL (ref 0.2–1)
BNP: 11 PG/ML (ref 1–100)
BNP: 11 PG/ML (ref 1–100)
BUN SERPL-MCNC: 11 MG/DL (ref 6–20)
BUN SERPL-MCNC: 11 MG/DL (ref 6–20)
CALCIUM: 9.4 MG/DL (ref 8.4–10.2)
CALCIUM: 9.4 MG/DL (ref 8.4–10.2)
CHLORIDE: 105 MEQ/L (ref 97–107)
CHLORIDE: 105 MEQ/L (ref 97–107)
CREATININE: 0.78 MG/DL (ref 0.6–1.3)
CREATININE: 0.78 MG/DL (ref 0.6–1.3)
DIFF_TYPE?: ABNORMAL
EOSINOPHIL: 17 % (ref 0–6)
EOSINOPHIL: 17 % (ref 0–6)
GLOBULIN_: 3.1 G/DL (ref 2–4.1)
GLOBULIN_: 3.1 G/DL (ref 2–4.1)
GLUCOSE LVL: 89 MG/DL (ref 70–99)
GLUCOSE LVL: 89 MG/DL (ref 70–99)
HCT VFR BLD CALC: 41 % (ref 33–45)
HCT VFR BLD CALC: 41 % (ref 33–45)
HEMOLYSIS 2+: NEGATIVE
HEMOLYSIS 4+: NEGATIVE
HGB BLD-MCNC: 13.1 G/DL (ref 11–15)
HGB BLD-MCNC: 13.1 G/DL (ref 11–15)
IMMATURE GRAN: 0.1 % (ref 0–0.3)
IMMATURE GRAN: 0.1 % (ref 0–0.3)
INSTR WBC: 9 K/CUMM (ref 4–11)
LIPEMIC 3+: NEGATIVE
LYMPHOCYTE: 25 %
LYMPHOCYTE: 25 %
MCH RBC QN AUTO: 27 PG (ref 25–35)
MCH RBC QN AUTO: 27 PG (ref 25–35)
MCHC RBC AUTO-ENTMCNC: 32 G/DL (ref 32–37)
MCHC RBC AUTO-ENTMCNC: 32 G/DL (ref 32–37)
MCV RBC AUTO: 85 FL (ref 78–97)
MCV RBC AUTO: 85 FL (ref 78–97)
MONOCYTE: 9 %
MONOCYTE: 9 %
NEUTROPHIL: 48 %
NEUTROPHIL: 48 %
NRBC BLD MANUAL-RTO: 0 % (ref 0–0.2)
NRBC BLD MANUAL-RTO: 0 % (ref 0–0.2)
PLATELET: 310 K/CUMM (ref 150–450)
PLATELET: 310 K/CUMM (ref 150–450)
POTASSIUM: 3.9 MEQ/L (ref 3.5–5.1)
POTASSIUM: 3.9 MEQ/L (ref 3.5–5.1)
RBC # BLD: 4.81 M/CUMM (ref 3.7–5.2)
RBC # BLD: 4.81 M/CUMM (ref 3.7–5.2)
RDW: 16.5 % (ref 11.5–14.5)
RDW: 16.5 % (ref 11.5–14.5)
SODIUM: 138 MEQ/L (ref 136–145)
SODIUM: 138 MEQ/L (ref 136–145)
TCO2: 25 MEQ/L (ref 19–29)
TCO2: 25 MEQ/L (ref 19–29)
TOTAL PROTEIN: 7 G/DL (ref 6.4–8.3)
TOTAL PROTEIN: 7 G/DL (ref 6.4–8.3)
WBC # BLD: 9 K/CUMM (ref 4–11)
WBC # BLD: 9 K/CUMM (ref 4–11)

## 2020-10-01 ENCOUNTER — HOSPITAL (OUTPATIENT)
Dept: OTHER | Age: 25
End: 2020-10-01
Attending: PHYSICIAN ASSISTANT

## 2020-10-01 LAB
SARS-COV-2 RNA RESP QL NAA+PROBE: NOT DETECTED
SPECIMEN SOURCE: NORMAL

## 2020-10-03 LAB
SARS-COV-2 RNA RESP QL NAA+PROBE: NOT DETECTED
SPECIMEN SOURCE: NORMAL

## 2021-08-28 ENCOUNTER — HOSPITAL ENCOUNTER (OUTPATIENT)
Age: 26
Discharge: HOME | End: 2021-08-28
Payer: COMMERCIAL

## 2021-08-28 VITALS
HEIGHT: 61 IN | RESPIRATION RATE: 16 BRPM | HEART RATE: 70 BPM | BODY MASS INDEX: 24.17 KG/M2 | DIASTOLIC BLOOD PRESSURE: 104 MMHG | OXYGEN SATURATION: 100 % | TEMPERATURE: 97 F | WEIGHT: 128 LBS | SYSTOLIC BLOOD PRESSURE: 143 MMHG

## 2021-08-28 DIAGNOSIS — Z20.822 ENCOUNTER FOR LABORATORY TESTING FOR COVID-19 VIRUS: Primary | ICD-10-CM

## 2021-08-28 PROCEDURE — 99212 OFFICE O/P EST SF 10 MIN: CPT | Performed by: NURSE PRACTITIONER

## 2021-08-28 NOTE — ED PROVIDER NOTES
Patient Seen in: [facility]      History   Patient presents with:  Testing    Stated Complaint: Testing    HPI/Subjective:   HPI    Patient presents to the immediate care requesting a COVID-19 test.  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 HENT: Negative. Eyes: Negative. Respiratory: Negative. Cardiovascular: Negative. Gastrointestinal: Negative. Genitourinary: Negative. Musculoskeletal: Negative. Skin: Negative. Neurological: Negative.     Psychiatric/Beh ED Course     Labs Reviewed   SARS-COV-2 BY PCR Caitlin Ferris)                   MDM      Multiple medical diagnoses were considered. Patient's COVID-19 swab is currently pending.   Advised that the patient does need to quarantine until she has a negative

## 2021-08-29 LAB — SARS-COV-2 RNA RESP QL NAA+PROBE: NOT DETECTED

## 2021-09-12 ENCOUNTER — HOSPITAL ENCOUNTER (OUTPATIENT)
Age: 26
Discharge: HOME | End: 2021-09-12
Payer: COMMERCIAL

## 2021-09-12 VITALS
HEIGHT: 61 IN | DIASTOLIC BLOOD PRESSURE: 98 MMHG | TEMPERATURE: 97 F | OXYGEN SATURATION: 99 % | RESPIRATION RATE: 16 BRPM | BODY MASS INDEX: 23.98 KG/M2 | SYSTOLIC BLOOD PRESSURE: 141 MMHG | WEIGHT: 127 LBS | HEART RATE: 72 BPM

## 2021-09-12 DIAGNOSIS — J02.9 SORE THROAT: Primary | ICD-10-CM

## 2021-09-12 DIAGNOSIS — J06.9 VIRAL URI: ICD-10-CM

## 2021-09-12 LAB
S PYO AG THROAT QL: NEGATIVE
SARS-COV-2 RNA RESP QL NAA+PROBE: NOT DETECTED

## 2021-09-12 PROCEDURE — 99213 OFFICE O/P EST LOW 20 MIN: CPT | Performed by: NURSE PRACTITIONER

## 2021-09-12 PROCEDURE — U0002 COVID-19 LAB TEST NON-CDC: HCPCS | Performed by: NURSE PRACTITIONER

## 2021-09-12 PROCEDURE — 87880 STREP A ASSAY W/OPTIC: CPT | Performed by: NURSE PRACTITIONER

## 2021-09-12 NOTE — ED PROVIDER NOTES
Patient Seen in: [facility] [place]      History   Patient presents with:  Sore Throat  Testing  Runny Nose    Stated Complaint: covid test sore throat    Subjective: The history is provided by the patient. Cough  This is a new problem.  The curr Cardiovascular: Negative. Negative for chest pain. Gastrointestinal: Negative. Genitourinary: Negative. Musculoskeletal: Negative. Negative for myalgias. Skin: Negative. Neurological: Negative. Negative for headaches.    Psychiatric/Behavi General: Skin is warm and dry. Neurological:      Mental Status: She is alert and oriented to person, place, and time. Deep Tendon Reflexes: Reflexes are normal and symmetric.    Psychiatric:         Behavior: Behavior normal.         Thought Content care doctor in 2-3 days. Patient verbalizes understanding of discharge instructions and plan of care. Agrees with plan of care at this time. Advised to return for any new or worsening symptoms. Follow-up instructions given.

## 2021-09-12 NOTE — ED INITIAL ASSESSMENT (HPI)
Pt c/o sore throat and congestion, requesting covid testing  started 9/5/21 recently finished antibiotics 9/16/21

## 2022-01-30 ENCOUNTER — HOSPITAL ENCOUNTER (OUTPATIENT)
Age: 27
Discharge: HOME | End: 2022-01-30
Payer: COMMERCIAL

## 2022-01-30 VITALS
TEMPERATURE: 99 F | HEART RATE: 80 BPM | OXYGEN SATURATION: 100 % | RESPIRATION RATE: 18 BRPM | SYSTOLIC BLOOD PRESSURE: 138 MMHG | DIASTOLIC BLOOD PRESSURE: 100 MMHG

## 2022-01-30 DIAGNOSIS — N89.8 VAGINAL DISCHARGE: Primary | ICD-10-CM

## 2022-01-30 LAB
B-HCG UR QL: NEGATIVE
BILIRUB UR QL STRIP: NEGATIVE
CLARITY UR: CLEAR
COLOR UR: YELLOW
GLUCOSE UR STRIP-MCNC: NEGATIVE MG/DL
KETONES UR STRIP-MCNC: NEGATIVE MG/DL
NITRITE UR QL STRIP: NEGATIVE
PH UR STRIP: 6.5 [PH]
PROT UR STRIP-MCNC: NEGATIVE MG/DL
SP GR UR STRIP: 1.02
UROBILINOGEN UR STRIP-ACNC: <2 MG/DL

## 2022-01-30 PROCEDURE — 87086 URINE CULTURE/COLONY COUNT: CPT | Performed by: PHYSICIAN ASSISTANT

## 2022-01-30 PROCEDURE — 81025 URINE PREGNANCY TEST: CPT | Performed by: PHYSICIAN ASSISTANT

## 2022-01-30 PROCEDURE — 87591 N.GONORRHOEAE DNA AMP PROB: CPT | Performed by: PHYSICIAN ASSISTANT

## 2022-01-30 PROCEDURE — 87205 SMEAR GRAM STAIN: CPT | Performed by: PHYSICIAN ASSISTANT

## 2022-01-30 PROCEDURE — 87491 CHLMYD TRACH DNA AMP PROBE: CPT | Performed by: PHYSICIAN ASSISTANT

## 2022-01-30 PROCEDURE — 99213 OFFICE O/P EST LOW 20 MIN: CPT | Performed by: PHYSICIAN ASSISTANT

## 2022-01-30 PROCEDURE — 96372 THER/PROPH/DIAG INJ SC/IM: CPT | Performed by: PHYSICIAN ASSISTANT

## 2022-01-30 PROCEDURE — 87808 TRICHOMONAS ASSAY W/OPTIC: CPT | Performed by: PHYSICIAN ASSISTANT

## 2022-01-30 PROCEDURE — 87106 FUNGI IDENTIFICATION YEAST: CPT | Performed by: PHYSICIAN ASSISTANT

## 2022-01-30 PROCEDURE — 81002 URINALYSIS NONAUTO W/O SCOPE: CPT | Performed by: PHYSICIAN ASSISTANT

## 2022-01-30 RX ORDER — DOXYCYCLINE HYCLATE 100 MG/1
100 CAPSULE ORAL 2 TIMES DAILY
Qty: 14 CAPSULE | Refills: 0 | Status: SHIPPED | OUTPATIENT
Start: 2022-01-30 | End: 2022-02-06

## 2022-01-30 RX ORDER — CEFTRIAXONE 500 MG/1
500 INJECTION, POWDER, FOR SOLUTION INTRAMUSCULAR; INTRAVENOUS ONCE
Status: COMPLETED | OUTPATIENT
Start: 2022-01-30 | End: 2022-01-30

## 2022-01-30 NOTE — ED PROVIDER NOTES
Patient Seen in: [facility]      History   Patient presents with:  Urinary Symptoms    Stated Complaint: Urinary    Subjective:   HPI    27-year-old female presenting with complaints of vaginal discharge, urinary frequency and urgency.    Kelsy Chilel Exam  Vitals and nursing note reviewed. Constitutional:       General: She is not in acute distress. HENT:      Head: Normocephalic and atraumatic.       Right Ear: External ear normal.      Left Ear: External ear normal.      Nose: Nose normal.      Yaneth known.  If positive inform partners of the need for         MDM                                    Disposition and Plan     Clinical Impression:  Vaginal discharge  (primary encounter diagnosis)     Disposition:  There is no disposition on file for this vi

## 2022-01-31 LAB
C TRACH DNA SPEC QL NAA+PROBE: NEGATIVE
N GONORRHOEA DNA SPEC QL NAA+PROBE: NEGATIVE

## 2022-02-01 LAB
GENITAL VAGINOSIS SCREEN: NEGATIVE
TRICHOMONAS SCREEN: NEGATIVE
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
## 2022-12-23 ENCOUNTER — HOSPITAL ENCOUNTER (OUTPATIENT)
Age: 27
Discharge: HOME | End: 2022-12-23
Payer: MEDICAID

## 2022-12-23 VITALS
TEMPERATURE: 98 F | OXYGEN SATURATION: 100 % | SYSTOLIC BLOOD PRESSURE: 152 MMHG | HEART RATE: 65 BPM | RESPIRATION RATE: 20 BRPM | DIASTOLIC BLOOD PRESSURE: 98 MMHG

## 2022-12-23 DIAGNOSIS — B97.89 VIRAL RESPIRATORY ILLNESS: Primary | ICD-10-CM

## 2022-12-23 DIAGNOSIS — Z20.822 ENCOUNTER FOR SCREENING LABORATORY TESTING FOR COVID-19 VIRUS: ICD-10-CM

## 2022-12-23 DIAGNOSIS — J98.8 VIRAL RESPIRATORY ILLNESS: Primary | ICD-10-CM

## 2022-12-23 LAB
POCT INFLUENZA A: NEGATIVE
POCT INFLUENZA B: NEGATIVE
S PYO AG THROAT QL: NEGATIVE
SARS-COV-2 RNA RESP QL NAA+PROBE: NOT DETECTED

## 2023-01-10 ENCOUNTER — TELEPHONE (OUTPATIENT)
Dept: URGENT CARE | Age: 28
End: 2023-01-10

## 2023-01-11 ENCOUNTER — OFFICE VISIT (OUTPATIENT)
Dept: URGENT CARE | Age: 28
End: 2023-01-11

## 2023-01-11 VITALS
BODY MASS INDEX: 23.06 KG/M2 | RESPIRATION RATE: 17 BRPM | OXYGEN SATURATION: 98 % | HEIGHT: 62 IN | TEMPERATURE: 98.5 F | WEIGHT: 125.33 LBS | SYSTOLIC BLOOD PRESSURE: 120 MMHG | HEART RATE: 81 BPM | DIASTOLIC BLOOD PRESSURE: 70 MMHG

## 2023-01-11 DIAGNOSIS — Z30.42 DEPO-PROVERA CONTRACEPTIVE STATUS: Primary | ICD-10-CM

## 2023-01-11 PROBLEM — F17.200 SMOKER: Status: ACTIVE | Noted: 2017-02-10

## 2023-01-11 PROCEDURE — 96372 THER/PROPH/DIAG INJ SC/IM: CPT | Performed by: NURSE PRACTITIONER

## 2023-01-11 PROCEDURE — 99203 OFFICE O/P NEW LOW 30 MIN: CPT | Performed by: NURSE PRACTITIONER

## 2023-01-11 RX ORDER — MEDROXYPROGESTERONE ACETATE 150 MG/ML
150 INJECTION, SUSPENSION INTRAMUSCULAR ONCE
Status: COMPLETED | OUTPATIENT
Start: 2023-01-11 | End: 2023-01-11

## 2023-01-11 RX ORDER — ALBUTEROL SULFATE 90 UG/1
AEROSOL, METERED RESPIRATORY (INHALATION)
COMMUNITY

## 2023-01-11 RX ADMIN — MEDROXYPROGESTERONE ACETATE 150 MG: 150 INJECTION, SUSPENSION INTRAMUSCULAR at 16:00

## 2023-01-11 ASSESSMENT — ENCOUNTER SYMPTOMS
ALLERGIC/IMMUNOLOGIC NEGATIVE: 1
FATIGUE: 0
NAUSEA: 0
WEAKNESS: 0
NUMBNESS: 0
CONSTITUTIONAL NEGATIVE: 1
EYES NEGATIVE: 1
RESPIRATORY NEGATIVE: 1
VISUAL CHANGE: 0
HEADACHES: 0
VOMITING: 0

## 2023-01-18 ENCOUNTER — HOSPITAL ENCOUNTER (OUTPATIENT)
Age: 28
Discharge: HOME | End: 2023-01-18
Payer: MEDICAID

## 2023-01-18 VITALS
HEART RATE: 65 BPM | DIASTOLIC BLOOD PRESSURE: 90 MMHG | RESPIRATION RATE: 20 BRPM | SYSTOLIC BLOOD PRESSURE: 134 MMHG | OXYGEN SATURATION: 98 % | TEMPERATURE: 99 F

## 2023-01-18 DIAGNOSIS — R53.83 FATIGUE, UNSPECIFIED TYPE: Primary | ICD-10-CM

## 2023-01-18 DIAGNOSIS — Z32.02 PREGNANCY TEST NEGATIVE: ICD-10-CM

## 2023-01-18 DIAGNOSIS — R11.0 NAUSEA: ICD-10-CM

## 2023-01-18 LAB — B-HCG UR QL: NEGATIVE

## 2023-01-18 PROCEDURE — 99213 OFFICE O/P EST LOW 20 MIN: CPT | Performed by: NURSE PRACTITIONER

## 2023-01-18 PROCEDURE — 81025 URINE PREGNANCY TEST: CPT | Performed by: NURSE PRACTITIONER

## 2023-01-18 RX ORDER — ONDANSETRON 4 MG/1
4 TABLET, ORALLY DISINTEGRATING ORAL EVERY 8 HOURS PRN
Qty: 10 TABLET | Refills: 0 | Status: SHIPPED | OUTPATIENT
Start: 2023-01-18

## 2023-01-18 NOTE — DISCHARGE INSTRUCTIONS
Take Zofran as needed for nausea. Drink plenty of fluids follow a bland diet bananas rice applesauce toast and crackers and advance as tolerated. Follow-up with your provider regarding the Depo shot and the symptoms that you have been experiencing in your visit to the immediate care center. Follow-up with your primary care doctor 2 to 3 days. If you develop a fever nausea with vomiting diarrhea severe abdominal pain back pain or any new or worsening symptoms go to the nearest emergency department.

## 2023-01-18 NOTE — ED INITIAL ASSESSMENT (HPI)
Patient comes in complains of nausea and fatigue 2-3days, missed work,  was the first depo shot and an , the second one was received 1/10. Patient was pregnant once before and felt similar so she took a pregnancy test and it was positive. Here to confirm pregnancy.

## 2023-03-22 ENCOUNTER — APPOINTMENT (OUTPATIENT)
Dept: URGENT CARE | Age: 28
End: 2023-03-22

## 2023-03-22 ENCOUNTER — WALK IN (OUTPATIENT)
Dept: URGENT CARE | Age: 28
End: 2023-03-22

## 2023-03-22 DIAGNOSIS — Z11.1 SCREENING-PULMONARY TB: Primary | ICD-10-CM

## 2023-03-22 PROCEDURE — 86580 TB INTRADERMAL TEST: CPT | Performed by: NURSE PRACTITIONER

## 2023-03-31 ENCOUNTER — WALK IN (OUTPATIENT)
Dept: URGENT CARE | Age: 28
End: 2023-03-31

## 2023-03-31 ENCOUNTER — APPOINTMENT (OUTPATIENT)
Dept: URGENT CARE | Age: 28
End: 2023-03-31

## 2023-03-31 ENCOUNTER — IMMUNIZATION (OUTPATIENT)
Dept: URGENT CARE | Age: 28
End: 2023-03-31

## 2023-03-31 VITALS — SYSTOLIC BLOOD PRESSURE: 138 MMHG | DIASTOLIC BLOOD PRESSURE: 98 MMHG

## 2023-03-31 DIAGNOSIS — Z11.1 SCREENING-PULMONARY TB: Primary | ICD-10-CM

## 2023-03-31 DIAGNOSIS — Z23 NEED FOR VACCINATION: Primary | ICD-10-CM

## 2023-03-31 PROCEDURE — 86580 TB INTRADERMAL TEST: CPT | Performed by: NURSE PRACTITIONER

## 2023-03-31 PROCEDURE — 90686 IIV4 VACC NO PRSV 0.5 ML IM: CPT | Performed by: NURSE PRACTITIONER

## 2023-04-02 ENCOUNTER — APPOINTMENT (OUTPATIENT)
Dept: URGENT CARE | Age: 28
End: 2023-04-02

## 2023-04-03 ENCOUNTER — WALK IN (OUTPATIENT)
Dept: URGENT CARE | Age: 28
End: 2023-04-03

## 2023-04-03 DIAGNOSIS — Z11.1 ENCOUNTER FOR PPD SKIN TEST READING: Primary | ICD-10-CM

## 2023-04-03 LAB
INDURATION: 0X0 MM (ref 0–?)
SKIN TEST RESULT: NEGATIVE

## 2023-04-21 ENCOUNTER — NURSE ONLY (OUTPATIENT)
Dept: URGENT CARE | Age: 28
End: 2023-04-21

## 2023-04-21 VITALS
SYSTOLIC BLOOD PRESSURE: 120 MMHG | BODY MASS INDEX: 24.91 KG/M2 | DIASTOLIC BLOOD PRESSURE: 70 MMHG | OXYGEN SATURATION: 99 % | HEIGHT: 61 IN | WEIGHT: 131.94 LBS | TEMPERATURE: 98.3 F | HEART RATE: 71 BPM

## 2023-04-21 DIAGNOSIS — Z30.42 DEPO-PROVERA CONTRACEPTIVE STATUS: Primary | ICD-10-CM

## 2023-04-21 LAB
B-HCG UR QL: NEGATIVE
INTERNAL PROCEDURAL CONTROLS ACCEPTABLE: YES
TEST LOT EXPIRATION DATE: NORMAL
TEST LOT NUMBER: NORMAL

## 2023-04-21 PROCEDURE — 81025 URINE PREGNANCY TEST: CPT | Performed by: NURSE PRACTITIONER

## 2023-04-21 PROCEDURE — 96372 THER/PROPH/DIAG INJ SC/IM: CPT | Performed by: NURSE PRACTITIONER

## 2023-04-21 PROCEDURE — 99213 OFFICE O/P EST LOW 20 MIN: CPT | Performed by: NURSE PRACTITIONER

## 2023-04-21 RX ORDER — MEDROXYPROGESTERONE ACETATE 150 MG/ML
150 INJECTION, SUSPENSION INTRAMUSCULAR ONCE
Status: DISCONTINUED | OUTPATIENT
Start: 2023-04-21 | End: 2023-04-21 | Stop reason: SDUPTHER

## 2023-04-21 RX ORDER — MEDROXYPROGESTERONE ACETATE 150 MG/ML
150 INJECTION, SUSPENSION INTRAMUSCULAR ONCE
Status: COMPLETED | OUTPATIENT
Start: 2023-04-21 | End: 2023-04-21

## 2023-04-21 RX ADMIN — MEDROXYPROGESTERONE ACETATE 150 MG: 150 INJECTION, SUSPENSION INTRAMUSCULAR at 14:18

## 2023-04-21 ASSESSMENT — ENCOUNTER SYMPTOMS
DIARRHEA: 0
SINUS PAIN: 0
CHEST TIGHTNESS: 0
LIGHT-HEADEDNESS: 0
VERTIGO: 0
ANOREXIA: 0
CHANGE IN BOWEL HABIT: 0
NAUSEA: 0
WHEEZING: 0
RESPIRATORY NEGATIVE: 1
EYES NEGATIVE: 1
WEAKNESS: 0
ENDOCRINE NEGATIVE: 1
DIZZINESS: 0
SHORTNESS OF BREATH: 0
COUGH: 0
CONSTITUTIONAL NEGATIVE: 1
SINUS PRESSURE: 0
SWOLLEN GLANDS: 0
VISUAL CHANGE: 0
PSYCHIATRIC NEGATIVE: 1
CHILLS: 0
NEUROLOGICAL NEGATIVE: 1
FEVER: 0
HEMATOLOGIC/LYMPHATIC NEGATIVE: 1
GASTROINTESTINAL NEGATIVE: 1
RHINORRHEA: 0
APPETITE CHANGE: 0
ABDOMINAL PAIN: 0
VOMITING: 0
SEIZURES: 0
SORE THROAT: 0
FATIGUE: 0
HEADACHES: 0
DIAPHORESIS: 0
CONSTIPATION: 0

## 2024-10-14 ENCOUNTER — HOSPITAL ENCOUNTER (OUTPATIENT)
Age: 29
Discharge: HOME | End: 2024-10-14
Payer: MEDICAID

## 2024-10-14 VITALS
HEART RATE: 74 BPM | TEMPERATURE: 98 F | OXYGEN SATURATION: 98 % | RESPIRATION RATE: 18 BRPM | DIASTOLIC BLOOD PRESSURE: 102 MMHG | SYSTOLIC BLOOD PRESSURE: 137 MMHG

## 2024-10-14 DIAGNOSIS — R03.0 ELEVATED BLOOD PRESSURE READING: ICD-10-CM

## 2024-10-14 DIAGNOSIS — J98.8 VIRAL RESPIRATORY ILLNESS: Primary | ICD-10-CM

## 2024-10-14 DIAGNOSIS — B97.89 VIRAL RESPIRATORY ILLNESS: Primary | ICD-10-CM

## 2024-10-14 LAB — SARS-COV-2 RNA RESP QL NAA+PROBE: NOT DETECTED

## 2024-10-14 PROCEDURE — U0002 COVID-19 LAB TEST NON-CDC: HCPCS | Performed by: NURSE PRACTITIONER

## 2024-10-14 PROCEDURE — 99213 OFFICE O/P EST LOW 20 MIN: CPT | Performed by: NURSE PRACTITIONER

## 2024-10-14 NOTE — ED PROVIDER NOTES
Patient Seen in: [facility] [place]    History   CC: congestion  HPI: Rossy Hawng 29 year old female  who presents c/o congestion, generalized fatigue, intermittent headaches over the last 4 days.  Denies significant cough, sore throat, fever, chills, difficulty breathing, difficulty swallowing/drooling, rash or GI signs/symptoms.    Past Medical History:    Anxiety    Asthma (HCC)    Depression    Drug abuse and dependence (HCC)    Essential hypertension    Heroin addiction (HCC)    Heroin overdose (HCC)       Past Surgical History:   Procedure Laterality Date    Other surgical history      birth fatuma removed on Left thigh at age 6    Repair of nasal septum         Family History   Problem Relation Age of Onset    Alcohol and Other Disorders Associated Father     Substance Abuse Father     Alcohol and Other Disorders Associated Maternal Grandfather     No Known Problems Mother     Alcohol and Other Disorders Associated Paternal Grandmother     ADHD Maternal Aunt     Alcohol and Other Disorders Associated Maternal Aunt     Substance Abuse Maternal Aunt     Anxiety Maternal Aunt        Social History     Socioeconomic History    Marital status: Single   Tobacco Use    Smoking status: Former     Current packs/day: 1.00     Average packs/day: 1 pack/day for 0.5 years (0.5 ttl pk-yrs)     Types: Cigarettes     Passive exposure: Never    Smokeless tobacco: Never    Tobacco comments:     quit 2020   Vaping Use    Vaping status: Every Day   Substance and Sexual Activity    Alcohol use: No     Comment: occassionally     Drug use: Not Currently     Types: benzodiazepines, Cannabis, Ketamine, Cocaine, Heroin, Ecstasy, Opiods, methamphetamine, Amphetamines, Barbituates, Codeine, Fentanyl, \"Crack\" cocaine, PCP, LSD, Psilocybin, Opium, Oxycodone, Methylphenidate     Comment: fentanyl   Social History Narrative    ** Merged History Encounter **          Social Drivers of Health     Food Insecurity: No Food Insecurity  (12/14/2019)    Received from [facility]    Hunger Vital Sign     Worried About Running Out of Food in the Last Year: Never true     Ran Out of Food in the Last Year: Never true    [facility]    Social Connections    [facility]       ROS:  Systems reviewed: All pertinent positives noted in HPI. Unless otherwise noted, additional systems reviewed are negative.   Vital signs reviewed.    Positive for stated complaint: Fatigue, Congestion  Other systems are as noted in HPI.  Constitutional and vital signs reviewed.      All other systems reviewed and negative except as noted above.    PSFH elements reviewed from today and agreed except as otherwise stated in HPI.             Constitutional and vital signs reviewed.        Physical Exam     ED Triage Vitals [10/14/24 1322]   BP (!) 137/106   Pulse 74   Resp 18   Temp 98.4 °F (36.9 °C)   Temp src Oral   SpO2 98 %   O2 Device None (Room air)       Current:BP (!) 137/102   Pulse 74   Temp 98.4 °F (36.9 °C) (Oral)   Resp 18   LMP 10/09/2024   SpO2 98%         PE:  General - Appears well, non-toxic and in NAD  Head - Appears symmetrical without deformity/swelling cranium, scalp, or facial bones  Eyes - sclera not injected, no discharge noted, no periorbital edema  ENT - EAC bilaterally without discharge, TM pearly grey with COL visualized appropriately bilaterally.   no nasal drainage noted in nares bilat, no cobblestoning to post. Pharynx.   Oropharynx clear, posterior pharynx is without erythema and without tonsilar enlargement or exudate, uvula midline, +gag, voice is clear. No trismus  Neck - no significant adenopathy, supple with trachea midline  Resp - Lung sounds clear bilaterally and wob unlabored, good aeration with equal, even expansion bilaterally   CV - RRR  Skin - no rashes or petechiae noted, pink warm and dry throughout, mmm, cap refill <2seconds  Neuro - A&O x4, steady  gait  MSK - makes purposeful movements of all extremities, radial pulses 2+ bilat.  Psych - Interactive and appropriate      ED Course     Labs Reviewed   RAPID SARS-COV-2 BY PCR - Normal       MDM     DDx: COVID-19, unspecified viral illness, seasonal rhinitis    Rapid COVID PCR negative.  General viral illness instructions reviewed, rest, hydration instructions, Tylenol or Motrin as needed for discomfort, decongestants, sinus rinses, Flonase nasal spray, sinus irrigation, humidifier etc. as well as follow-up and return/ED precautions reviewed.  Patient is historian and demonstrates understanding of all instruction and agrees with plan of care.      Disposition and Plan     Clinical Impression:  1. Viral respiratory illness    2. Elevated blood pressure reading        Disposition:  Discharge    Follow-up:  Chrissy Ennis MD  [address] [place] [address] [phone]    Go in 1 week  As needed      Medications Prescribed:  Discharge Medication List as of 10/14/2024  1:39 PM

## 2024-10-20 ENCOUNTER — HOSPITAL ENCOUNTER (OUTPATIENT)
Age: 29
Discharge: HOME | End: 2024-10-20
Payer: MEDICAID

## 2024-10-20 VITALS
SYSTOLIC BLOOD PRESSURE: 135 MMHG | TEMPERATURE: 99 F | HEART RATE: 83 BPM | DIASTOLIC BLOOD PRESSURE: 88 MMHG | RESPIRATION RATE: 18 BRPM | OXYGEN SATURATION: 99 %

## 2024-10-20 DIAGNOSIS — J01.90 ACUTE SINUSITIS, RECURRENCE NOT SPECIFIED, UNSPECIFIED LOCATION: Primary | ICD-10-CM

## 2024-10-20 LAB — S PYO AG THROAT QL: NEGATIVE

## 2024-10-20 NOTE — ED PROVIDER NOTES
Patient Seen in: [facility]      History     Chief Complaint   Patient presents with    Sore Throat    Sinus Problem     Stated Complaint: Fever, sore throat, diarrhea    Subjective:   HPI      Pt with congestion, fatigue x 2 weeks. Pt felt symptoms improving mid week, but symptoms returned on Friday. Pt had worsening sore throat, sinus pressure, headaches, loose stools, subjective fever. +decreased appetite.   Took tylenol for symptoms with minimal relief.  No vomiting, difficulty swallowing, chest pain, shortness of breath, abdominal pain. Tolerating PO intake. Pt has hx of drug use, snorting heroin, no IV drug use. Has not used in several years.     Objective:     Past Medical History:    Anxiety    Asthma (HCC)    Depression    Drug abuse and dependence (HCC)    Essential hypertension    Heroin addiction (HCC)    Heroin overdose (HCC)              Past Surgical History:   Procedure Laterality Date    Other surgical history      birth fatuma removed on Left thigh at age 6    Repair of nasal septum                  Social History     Socioeconomic History    Marital status: Single   Tobacco Use    Smoking status: Former     Current packs/day: 1.00     Average packs/day: 1 pack/day for 0.5 years (0.5 ttl pk-yrs)     Types: Cigarettes     Passive exposure: Never    Smokeless tobacco: Never    Tobacco comments:     quit 2020   Vaping Use    Vaping status: Former   Substance and Sexual Activity    Alcohol use: Yes     Comment: occassionally     Drug use: Not Currently     Types: benzodiazepines, Cannabis, Ketamine, Cocaine, Heroin, Ecstasy, Opiods, methamphetamine, Amphetamines, Barbituates, Codeine, Fentanyl, \"Crack\" cocaine, PCP, LSD, Psilocybin, Opium, Oxycodone, Methylphenidate     Comment: fentanyl   Social History Narrative    ** Merged History Encounter **          Social Drivers of Health     Food Insecurity: No Food Insecurity (12/14/2019)    Received from [facility]  Vital Sign     Worried About Running Out of Food in the Last Year: Never true     Ran Out of Food in the Last Year: Never true    Received from [facility]    Social Connections    Received from [facility]              Review of Systems    Positive for stated complaint: Fever, sore throat, diarrhea  Other systems are as noted in HPI.  Constitutional and vital signs reviewed.      All other systems reviewed and negative except as noted above.    Physical Exam     ED Triage Vitals [10/20/24 1347]   /88   Pulse 83   Resp 18   Temp 99 °F (37.2 °C)   Temp src Temporal   SpO2 99 %   O2 Device None (Room air)       Current Vitals:   Vital Signs  BP: 135/88  Pulse: 83  Resp: 18  Temp: 99 °F (37.2 °C)  Temp src: Temporal    Oxygen Therapy  SpO2: 99 %  O2 Device: None (Room air)        Physical Exam  Vitals and nursing note reviewed.   Constitutional:       General: She is not in acute distress.     Appearance: Normal appearance. She is not ill-appearing or toxic-appearing.   HENT:      Head: Normocephalic.      Ears:      Comments: Serous effusion bilaterally      Nose: Nose normal.      Mouth/Throat:      Mouth: Mucous membranes are moist.      Comments: Uvula midline.  Erythematous posterior oropharynx.  No tonsillar exudates or abnormality.  Purulent postnasal drip  Eyes:      Conjunctiva/sclera: Conjunctivae normal.   Cardiovascular:      Rate and Rhythm: Normal rate and regular rhythm.   Pulmonary:      Effort: Pulmonary effort is normal. No respiratory distress.      Breath sounds: Normal breath sounds.   Musculoskeletal:         General: Normal range of motion.      Cervical back: Normal range of motion.   Skin:     General: Skin is warm.   Neurological:      General: No focal deficit present.      Mental Status: She is alert.   Psychiatric:         Mood and Affect: Mood normal.         Behavior: Behavior normal.           ED Course     Labs Reviewed   POCT RAPID STREP -  Normal                   MDM   Tests:  Negative strep  Acute Sinusitis.   Differential: allergic vs viral vs bacterial   VSS. Nontoxic. Well appearing. Advised sx care - mucinex, steam showers and hot fluids. Abx provided to cover for bacterial sinusitis due to worsening symptoms and symptoms lasting over 2 weeks.  Close PCP follow up. ER precautions advised.                  Medical Decision Making      Disposition and Plan     Clinical Impression:  1. Acute sinusitis, recurrence not specified, unspecified location         Disposition:  Discharge  10/20/2024  2:39 pm    Follow-up:  Chrissy Ennis MD  [id number] [address]  [phone]          [address] [address]  [phone]              Medications Prescribed:  Discharge Medication List as of 10/20/2024  2:43 PM        START taking these medications    Details   amoxicillin clavulanate 875-125 MG Oral Tab Take 1 tablet by mouth 2 (two) times daily for 7 days., Normal, Disp-14 tablet, R-0                 Supplementary Documentation:

## 2024-10-20 NOTE — DISCHARGE INSTRUCTIONS
Seek immediate medical attention if any fevers, chest pain, shortness of breath, vomiting, palpitations, extreme fatigue, difficulty swallowing, worsening of symptoms or any other concerns

## 2024-10-20 NOTE — ED INITIAL ASSESSMENT (HPI)
Pt c/o fever, sore throat, sinus pressure, runny nose with nose bleeds, fatigue since yesterday.  States seen at [facility] 10/14/24 and dx'd with viral illness, neg covid test.  Neg home covid test yesterday. States feeling worse since yesterday.  Tmax 100.1.  Tylenol last dose at 12pm today.  No cough.

## 2025-01-27 ENCOUNTER — HOSPITAL ENCOUNTER (OUTPATIENT)
Age: 30
Discharge: HOME | End: 2025-01-27
Payer: MEDICAID

## 2025-01-27 VITALS
RESPIRATION RATE: 18 BRPM | OXYGEN SATURATION: 98 % | TEMPERATURE: 98 F | DIASTOLIC BLOOD PRESSURE: 105 MMHG | HEART RATE: 73 BPM | SYSTOLIC BLOOD PRESSURE: 153 MMHG

## 2025-01-27 DIAGNOSIS — J10.1 INFLUENZA A: ICD-10-CM

## 2025-01-27 DIAGNOSIS — R05.9 COUGH: Primary | ICD-10-CM

## 2025-01-27 LAB
POCT INFLUENZA A: POSITIVE
POCT INFLUENZA B: NEGATIVE
SARS-COV-2 RNA RESP QL NAA+PROBE: NOT DETECTED

## 2025-01-27 PROCEDURE — 87502 INFLUENZA DNA AMP PROBE: CPT | Performed by: NURSE PRACTITIONER

## 2025-01-27 PROCEDURE — 99213 OFFICE O/P EST LOW 20 MIN: CPT | Performed by: NURSE PRACTITIONER

## 2025-01-27 PROCEDURE — U0002 COVID-19 LAB TEST NON-CDC: HCPCS | Performed by: NURSE PRACTITIONER

## 2025-01-27 NOTE — ED PROVIDER NOTES
Patient Seen in: [facility]      History   No chief complaint on file.    Stated Complaint: Sinus issue    Subjective:   HPI      29-year-old female, history of HTN anxiety, asthma, depression, presents with sinus congestion cold symptoms fever body aches that began suddenly while at work on Friday.  No difficulty breathing no vomiting but states she did have episode of vomiting on Friday.  She needs a note to be off work.    Objective:     Past Medical History:    Anxiety    Asthma (HCC)    Depression    Drug abuse and dependence (HCC)    Essential hypertension    Heroin addiction (HCC)    Heroin overdose (HCC)              Past Surgical History:   Procedure Laterality Date    Other surgical history      birth fatuma removed on Left thigh at age 6    Repair of nasal septum                  Social History     Socioeconomic History    Marital status: Single   Tobacco Use    Smoking status: Former     Current packs/day: 1.00     Average packs/day: 1 pack/day for 0.5 years (0.5 ttl pk-yrs)     Types: Cigarettes     Passive exposure: Never    Smokeless tobacco: Never    Tobacco comments:     quit 2020   Vaping Use    Vaping status: Former   Substance and Sexual Activity    Alcohol use: Yes     Comment: occassionally     Drug use: Not Currently     Types: benzodiazepines, Cannabis, Ketamine, Cocaine, Heroin, Ecstasy, Opiods, methamphetamine, Amphetamines, Barbituates, Codeine, Fentanyl, \"Crack\" cocaine, PCP, LSD, Psilocybin, Opium, Oxycodone, Methylphenidate     Comment: fentanyl   Social History Narrative    ** Merged History Encounter **          Social Drivers of Health     Financial Resource Strain: Low Risk  (10/29/2024)    Received from [facility]    Overall Financial Resource Strain (CARDIA)     Difficulty of Paying Living Expenses: Not very hard   Food Insecurity: No Food Insecurity (10/29/2024)    Received from [facility]    Hunger Vital Sign     Worried  About Running Out of Food in the Last Year: Never true     Ran Out of Food in the Last Year: Never true   Transportation Needs: No Transportation Needs (10/29/2024)    Received from [facility]    PRAPARE - Transportation     Lack of Transportation (Medical): No     Lack of Transportation (Non-Medical): No    Received from [facility]    Social Connections   Housing Stability: High Risk (10/29/2024)    Received from [facility]    Housing Stability Vital Sign     Unable to Pay for Housing in the Last Year: No     Number of Times Moved in the Last Year: 2     Homeless in the Last Year: No              Review of Systems    Positive for stated complaint: Sinus issue  Other systems are as noted in HPI.  Constitutional and vital signs reviewed.      All other systems reviewed and negative except as noted above.    Physical Exam     ED Triage Vitals [01/27/25 1551]   BP (!) 153/105   Pulse 73   Resp 18   Temp 98 °F (36.7 °C)   Temp src Oral   SpO2 98 %   O2 Device None (Room air)       Current Vitals:   Vital Signs  BP: (!) 153/105  Pulse: 73  Resp: 18  Temp: 98 °F (36.7 °C)  Temp src: Oral    Oxygen Therapy  SpO2: 98 %  O2 Device: None (Room air)        Physical Exam  Vitals and nursing note reviewed.   Constitutional:       Appearance: Normal appearance.   HENT:      Right Ear: Tympanic membrane normal.      Left Ear: Tympanic membrane normal.      Mouth/Throat:      Pharynx: Posterior oropharyngeal erythema present. No oropharyngeal exudate.   Eyes:      Extraocular Movements: Extraocular movements intact.      Pupils: Pupils are equal, round, and reactive to light.   Cardiovascular:      Rate and Rhythm: Normal rate and regular rhythm.      Pulses: Normal pulses.      Heart sounds: Normal heart sounds.   Pulmonary:      Effort: Pulmonary effort is normal.      Breath sounds: Normal breath sounds.   Musculoskeletal:      Cervical back: Normal range of motion  and neck supple.   Skin:     General: Skin is warm and dry.   Neurological:      Mental Status: She is alert.             ED Course     Labs Reviewed   POCT FLU TEST - Abnormal; Notable for the following components:       Result Value    POCT INFLUENZA A Positive (*)     All other components within normal limits    Narrative:     This assay is a rapid molecular in vitro test utilizing nucleic acid amplification of influenza A and B viral RNA.   RAPID SARS-COV-2 BY PCR - Normal                   MDM      Differentials include but not limited to influenza versus URI versus COVID versus pneumonia  Influenza positive a  COVID negative  Supportive care: Run humidifier, increase fluids, elevate HOB, NSAIDs, Tylenol frequent nasal clearing, saline spray/steam showers. Educated on worsening signs and symptoms of illness.   Decongestants Mucinex/expectorant  Close PMD follow-up advised if sx persist  All vital signs are stable/sats appropriate on room air  Plan dc home to Follow-up with pmd/return to the immediate care for any new or worsening symptoms at any time             Medical Decision Making  Problems Addressed:  Cough: acute illness or injury  Influenza A: acute illness or injury    Risk  OTC drugs.  Prescription drug management.        Disposition and Plan     Clinical Impression:  1. Cough    2. Influenza A         Disposition:  Discharge  1/27/2025  4:29 pm    Follow-up:  Chrissy Ennis MD  [address] [address]  [phone]    Schedule an appointment as soon as possible for a visit             Medications Prescribed:  Current Discharge Medication List              Supplementary Documentation:

## (undated) NOTE — LETTER
Date & Time: 1/27/2025, 4:29 PM  Patient: Rossy Hwang  Encounter Provider(s):    Lorin Gómez APRN       To Whom It May Concern:    Rossy Hwang was seen and treated in our department on 1/27/2025. She should not return to work until 1/29/25  .    If you have any questions or concerns, please do not hesitate to call.        _____________________________  Physician/APC Signature

## (undated) NOTE — LETTER
Date & Time: 7/3/2020, 9:43 AM  Patient: [employer]  Encounter Provider(s):    MADELIN Tapia       To Whom It May Concern:    Ella Reinoso was seen and treated in our department on 7/3/2020.  She should not return to work until Monday, Aubery July

## (undated) NOTE — LETTER
Date & Time: 12/23/2022, 1:07 PM  Patient: Brad Harding  Encounter Provider(s):    MADELIN Sandoval       To Whom It May Concern:    Jackie Cedeño was seen and treated in our department on 12/23/2022. She can return to work 12/24/22.     If you have any questions or concerns, please do not hesitate to call.        _____________________________  Physician/APC Signature

## (undated) NOTE — ED AVS SNAPSHOT
Alycia Lepe   MRN: [id number]    Department:  [facility] Emergency Department   Date of Visit:  10/14/2017           Disclosure     Insurance plans vary and the physician(s) referred by the ER may not be covered by your plan.  Please contact CARE PHYSICIAN AT ONCE OR RETURN IMMEDIATELY TO THE EMERGENCY DEPARTMENT. If you have been prescribed any medication(s), please fill your prescription right away and begin taking the medication(s) as directed.   If you believe that any of the medications

## (undated) NOTE — LETTER
Date & Time: 10/20/2024, 2:40 PM  Patient: Rossy Hwang  Encounter Provider(s):    Shireen Nascimento PA       To Whom It May Concern:    Rossy Hwang was seen and treated in our department on 10/20/2024. Please excuse her from work until 10/23/24    If you have any questions or concerns, please do not hesitate to call.        _____________________________  Physician/APC Signature

## (undated) NOTE — ED AVS SNAPSHOT
Ella Reinoso   MRN: [id number]    Department:  [facility] Emergency Department   Date of Visit:  11/9/2019           Disclosure     Insurance plans vary and the physician(s) referred by the ER may not be covered by your plan.  Please contact CARE PHYSICIAN AT ONCE OR RETURN IMMEDIATELY TO THE EMERGENCY DEPARTMENT. If you have been prescribed any medication(s), please fill your prescription right away and begin taking the medication(s) as directed.   If you believe that any of the medications

## (undated) NOTE — LETTER
Date & Time: 10/14/2024, 1:47 PM  Patient: Rossy Hwang  Encounter Provider(s):    Kamille Altamirano APRN       To Whom It May Concern:    Rossy Hwang was seen and treated in our department on 10/14/2024. She  should be excused from work thru 10/15/2024 .    If you have any questions or concerns, please do not hesitate to call.        _____________________________  Physician/APC Signature

## (undated) NOTE — ED AVS SNAPSHOT
Debby Henriquez   MRN: [id number]    Department:  [facility] Emergency Department   Date of Visit:  11/7/2019           Disclosure     Insurance plans vary and the physician(s) referred by the ER may not be covered by your plan.  Please contact CARE PHYSICIAN AT ONCE OR RETURN IMMEDIATELY TO THE EMERGENCY DEPARTMENT. If you have been prescribed any medication(s), please fill your prescription right away and begin taking the medication(s) as directed.   If you believe that any of the medications

## (undated) NOTE — LETTER
Date & Time: 1/28/2025, 12:11 PM  Patient: Rossy Hwang  Encounter Provider(s):    Lorin Gómez APRN       To Whom It May Concern:    Rossy Hwang was seen and treated in our department on 1/27/2025. She should not return to work until fever free for 24 hours without the use of medication .    If you have any questions or concerns, please do not hesitate to call.      MADELIN Zacarias  _____________________________  Physician/APC Signature

## (undated) NOTE — ED AVS SNAPSHOT
Saima Evans   MRN: [id number]    Department:  [facility] Emergency Department   Date of Visit:  11/13/2019           Disclosure     Insurance plans vary and the physician(s) referred by the ER may not be covered by your plan.  Please contact CARE PHYSICIAN AT ONCE OR RETURN IMMEDIATELY TO THE EMERGENCY DEPARTMENT. If you have been prescribed any medication(s), please fill your prescription right away and begin taking the medication(s) as directed.   If you believe that any of the medications

## (undated) NOTE — ED AVS SNAPSHOT
Kirsten Tuttle   MRN: [id number]    Department:  [facility] Emergency Department   Date of Visit:  8/20/2019           Disclosure     Insurance plans vary and the physician(s) referred by the ER may not be covered by your plan.  Please contact CARE PHYSICIAN AT ONCE OR RETURN IMMEDIATELY TO THE EMERGENCY DEPARTMENT. If you have been prescribed any medication(s), please fill your prescription right away and begin taking the medication(s) as directed.   If you believe that any of the medications

## (undated) NOTE — MR AVS SNAPSHOT
[facility]  [address] [facility] [id number] [phone]               Thank you for choosing us for your health care visit with Virginia Blanc MD.  We are glad to serve you and happy to provide you with this summary of your of going away, sadness and despair become a major part of your life. This makes it hard for you to function. Treatment is needed to bring balance back into your life. Why me? 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  You may be grieving for frie counseling are two common treatments for depression. The best treatment often involves a combination of both. · Antidepressant medications help relieve symptoms. It may take a few weeks for an antidepressant to start to improve your mood.  If it doesn’t se Keeping in contact with friends you served with can also make a big difference.   · Get relief from stress. Ask your healthcare provider about relaxation exercises and techniques to help relieve stress. · Eat right.  A balanced and healthy diet helps keep ([phone]) and press “1” to be routed to the [address] [place] [address]. Date Last Reviewed: 2/4/2015  © 7226-3126 The [address]. All rights reserved.  This information is not intended · Get relief from stress. Ask your healthcare provider for relaxation exercises and techniques to help relieve stress. · Eat right. A balanced and healthy diet helps keep your body healthy.   Date Last Reviewed: 1/19/2015  © 2996-6111 The [employer], CO2 24 22-32 mmol/L    BUN 8 8-20 mg/dL    Creatinine 0.79 0.50-1.50 mg/dL    Calcium, Total 9.2 8.5-10.5 mg/dL    ALT 14 14-54 U/L    AST 17 15-41 U/L    Alkaline Phosphatase 45  U/L    Bilirubin, Total 0.3 0.3-1.2 mg/dL    Total Protein 6.7 5.9-8. WBC 9.7 4.0-11.0 K/UL    RBC 4.07 3.70-5.40 M/UL    HGB 12.2 12.0-16.0 g/dL    HCT 36.7 35.0-48.0 %    MCV 90.2 80.0-100.0 fL    MCH 30.0 27.0-32.0 pg    MCHC 33.3 32.0-37.0 g/dl    RDW 14.7 11.0-15.0 %     140-400 K/UL    MPV 8.5 7.4-10.3 fL    Ne Enter your [place] Activation Code exactly as it appears below along with your Zip Code and Date of Birth to complete the sign-up process. If you do not sign up before the expiration date, you must request a new code.     Your unique [place] Access Code: [email or web address]

## (undated) NOTE — LETTER
Date & Time: 1/18/2023, 5:23 PM  Patient: Airam Marroquin  Encounter Provider(s):    MADELIN Thomas       To Whom It May Concern:    Kaiden Christina was seen and treated in our department on 1/18/2023. She should not return to work until 01/20/2023. If you have any questions or concerns, please do not hesitate to call.       IVAN Ghosh  _____________________________  [id number]/EXC Signature

## (undated) NOTE — ED AVS SNAPSHOT
Radha Grant   MRN: [id number]    Department:  [facility] Emergency Department   Date of Visit:  10/18/2019           Disclosure     Insurance plans vary and the physician(s) referred by the ER may not be covered by your plan.  Please contact CARE PHYSICIAN AT ONCE OR RETURN IMMEDIATELY TO THE EMERGENCY DEPARTMENT. If you have been prescribed any medication(s), please fill your prescription right away and begin taking the medication(s) as directed.   If you believe that any of the medications